# Patient Record
Sex: MALE | Race: BLACK OR AFRICAN AMERICAN | NOT HISPANIC OR LATINO | Employment: UNEMPLOYED | URBAN - METROPOLITAN AREA
[De-identification: names, ages, dates, MRNs, and addresses within clinical notes are randomized per-mention and may not be internally consistent; named-entity substitution may affect disease eponyms.]

---

## 2023-06-28 ENCOUNTER — HOSPITAL ENCOUNTER (OUTPATIENT)
Dept: DATA CONVERSION | Facility: HOSPITAL | Age: 28
End: 2023-06-28

## 2023-07-02 ENCOUNTER — HOSPITAL ENCOUNTER (OUTPATIENT)
Dept: DATA CONVERSION | Facility: HOSPITAL | Age: 28
End: 2023-07-02

## 2023-07-25 ENCOUNTER — HOSPITAL ENCOUNTER (OUTPATIENT)
Dept: DATA CONVERSION | Facility: HOSPITAL | Age: 28
End: 2023-07-25
Attending: EMERGENCY MEDICINE

## 2023-07-25 DIAGNOSIS — Z53.21 PROCEDURE AND TREATMENT NOT CARRIED OUT DUE TO PATIENT LEAVING PRIOR TO BEING SEEN BY HEALTH CARE PROVIDER: ICD-10-CM

## 2023-11-07 ENCOUNTER — HOSPITAL ENCOUNTER (EMERGENCY)
Facility: HOSPITAL | Age: 28
Discharge: HOME | End: 2023-11-07
Payer: MEDICAID

## 2023-11-07 ENCOUNTER — APPOINTMENT (OUTPATIENT)
Dept: RADIOLOGY | Facility: HOSPITAL | Age: 28
End: 2023-11-07

## 2023-11-07 VITALS
TEMPERATURE: 99 F | HEART RATE: 85 BPM | RESPIRATION RATE: 16 BRPM | DIASTOLIC BLOOD PRESSURE: 77 MMHG | SYSTOLIC BLOOD PRESSURE: 124 MMHG | OXYGEN SATURATION: 98 %

## 2023-11-07 DIAGNOSIS — R07.9 NONSPECIFIC CHEST PAIN: Primary | ICD-10-CM

## 2023-11-07 LAB
ANION GAP SERPL CALC-SCNC: 10 MMOL/L (ref 10–20)
BASOPHILS # BLD AUTO: 0.03 X10*3/UL (ref 0–0.1)
BASOPHILS NFR BLD AUTO: 0.4 %
BUN SERPL-MCNC: 9 MG/DL (ref 6–23)
CALCIUM SERPL-MCNC: 9.7 MG/DL (ref 8.6–10.3)
CARDIAC TROPONIN I PNL SERPL HS: 3 NG/L (ref 0–20)
CHLORIDE SERPL-SCNC: 101 MMOL/L (ref 98–107)
CO2 SERPL-SCNC: 31 MMOL/L (ref 21–32)
CREAT SERPL-MCNC: 0.89 MG/DL (ref 0.5–1.3)
D DIMER PPP FEU-MCNC: 363 NG/ML FEU
EOSINOPHIL # BLD AUTO: 0.12 X10*3/UL (ref 0–0.7)
EOSINOPHIL NFR BLD AUTO: 1.8 %
ERYTHROCYTE [DISTWIDTH] IN BLOOD BY AUTOMATED COUNT: 12.6 % (ref 11.5–14.5)
GFR SERPL CREATININE-BSD FRML MDRD: >90 ML/MIN/1.73M*2
GLUCOSE SERPL-MCNC: 87 MG/DL (ref 74–99)
HCT VFR BLD AUTO: 44.4 % (ref 41–52)
HGB BLD-MCNC: 15.1 G/DL (ref 13.5–17.5)
IMM GRANULOCYTES # BLD AUTO: 0.02 X10*3/UL (ref 0–0.7)
IMM GRANULOCYTES NFR BLD AUTO: 0.3 % (ref 0–0.9)
LYMPHOCYTES # BLD AUTO: 1.79 X10*3/UL (ref 1.2–4.8)
LYMPHOCYTES NFR BLD AUTO: 26.4 %
MCH RBC QN AUTO: 33 PG (ref 26–34)
MCHC RBC AUTO-ENTMCNC: 34 G/DL (ref 32–36)
MCV RBC AUTO: 97 FL (ref 80–100)
MONOCYTES # BLD AUTO: 0.69 X10*3/UL (ref 0.1–1)
MONOCYTES NFR BLD AUTO: 10.2 %
NEUTROPHILS # BLD AUTO: 4.14 X10*3/UL (ref 1.2–7.7)
NEUTROPHILS NFR BLD AUTO: 60.9 %
NRBC BLD-RTO: 0 /100 WBCS (ref 0–0)
PLATELET # BLD AUTO: 326 X10*3/UL (ref 150–450)
POTASSIUM SERPL-SCNC: 4.2 MMOL/L (ref 3.5–5.3)
RBC # BLD AUTO: 4.58 X10*6/UL (ref 4.5–5.9)
SODIUM SERPL-SCNC: 138 MMOL/L (ref 136–145)
WBC # BLD AUTO: 6.8 X10*3/UL (ref 4.4–11.3)

## 2023-11-07 PROCEDURE — 94760 N-INVAS EAR/PLS OXIMETRY 1: CPT

## 2023-11-07 PROCEDURE — 99285 EMERGENCY DEPT VISIT HI MDM: CPT | Mod: 25

## 2023-11-07 PROCEDURE — 99283 EMERGENCY DEPT VISIT LOW MDM: CPT | Mod: 25

## 2023-11-07 PROCEDURE — 36415 COLL VENOUS BLD VENIPUNCTURE: CPT | Performed by: EMERGENCY MEDICINE

## 2023-11-07 PROCEDURE — 85379 FIBRIN DEGRADATION QUANT: CPT | Performed by: EMERGENCY MEDICINE

## 2023-11-07 PROCEDURE — 85025 COMPLETE CBC W/AUTO DIFF WBC: CPT | Performed by: EMERGENCY MEDICINE

## 2023-11-07 PROCEDURE — 84484 ASSAY OF TROPONIN QUANT: CPT | Performed by: EMERGENCY MEDICINE

## 2023-11-07 PROCEDURE — 71046 X-RAY EXAM CHEST 2 VIEWS: CPT | Mod: FOREIGN READ | Performed by: RADIOLOGY

## 2023-11-07 PROCEDURE — 80048 BASIC METABOLIC PNL TOTAL CA: CPT | Performed by: EMERGENCY MEDICINE

## 2023-11-07 PROCEDURE — 71046 X-RAY EXAM CHEST 2 VIEWS: CPT | Mod: FY

## 2023-11-07 RX ORDER — NAPROXEN 500 MG/1
500 TABLET ORAL
Qty: 14 TABLET | Refills: 0 | Status: SHIPPED | OUTPATIENT
Start: 2023-11-07 | End: 2023-11-14

## 2023-11-07 ASSESSMENT — LIFESTYLE VARIABLES
EVER FELT BAD OR GUILTY ABOUT YOUR DRINKING: NO
EVER HAD A DRINK FIRST THING IN THE MORNING TO STEADY YOUR NERVES TO GET RID OF A HANGOVER: NO
HAVE YOU EVER FELT YOU SHOULD CUT DOWN ON YOUR DRINKING: NO
HAVE PEOPLE ANNOYED YOU BY CRITICIZING YOUR DRINKING: NO
REASON UNABLE TO ASSESS: NO

## 2023-11-07 ASSESSMENT — COLUMBIA-SUICIDE SEVERITY RATING SCALE - C-SSRS
2. HAVE YOU ACTUALLY HAD ANY THOUGHTS OF KILLING YOURSELF?: NO
6. HAVE YOU EVER DONE ANYTHING, STARTED TO DO ANYTHING, OR PREPARED TO DO ANYTHING TO END YOUR LIFE?: NO
1. IN THE PAST MONTH, HAVE YOU WISHED YOU WERE DEAD OR WISHED YOU COULD GO TO SLEEP AND NOT WAKE UP?: NO

## 2023-11-07 NOTE — ED PROVIDER NOTES
Limitations to History: None     HPI:       Corey Maciel is a 28 y.o. -American male with significant past medical history for IV drug use who presents to ED today from [facility] for evaluation of right-sided chest pain.  5 days ago the patient developed pain in the right anterior chest that is described as a nonradiating aching sensation that comes and goes currently rated 4/10.  No history of PE/DVT, recent travel, recent surgery, history malignancy or use of exogenous hormones.  No family history for MI or sudden cardiac death at a young age.  Denies fever/chills, cough/cold symptoms, shortness of breath, nausea/vomiting, abdominal pain, urinary symptoms, change in bowel habits or any other complaints.  Last use of meth, EtOH and smoking was 2 months ago.  No PCP.      Additional History Obtained from: None    ------------------------------------------------------------------------------------------------------------------------------------------    VS: As documented in the triage note and EMR flowsheet from this visit were reviewed.    Physical Exam:  Gen: Well-appearing 28-year-old -American male, nontoxic looking.  Pleasant and interactive.  Well-hydrated and nourished.  Head/Neck: NCAT, neck w/ FROM  Eyes: EOMI, PERRL, anicteric sclerae, noninjected conjunctivae  Ears: TMs clear b/l without sign of infection  Nose: Nares patent w/o rhinorrhea  Mouth:  MMM, no OP lesions noted  Heart: RRR no MRG.  Chest pain not reproducible.  Lungs: CTA b/l no RRW, no increased work of breathing  Abdomen: soft, NT, ND, no HSM, no palpable masses  Musculoskeletal: no joint swelling noted  Extremities: WWP, no c/c/e, cap refill <2sec  Neurologic: Alert, symmetrical facies, phonates clearly, moves all extremities equally, responsive to touch, ambulates normally   Skin: Pink warm and dry.  No rashes  noted        ------------------------------------------------------------------------------------------------------------------------------------------    Medical Decision Making:     ED Course as of 11/07/23 1817   Tue Nov 07, 2023 1811 28-year-old male is evaluated the bedside for right-sided chest pain that began 5 days ago and has been intermittent.  I saw the patient in triage, labs and imaging were placed.  EKG shows sinus rhythm. Laboratory studies were reviewed, no leukocytosis or evidence of anemia.  Normal kidney function, electrolytes and LFTs.  Troponin normal.  BRYON score 0.  With  D-dimer 363, low suspicion for PE.  Chest x-ray does not show pneumonia.  With a negative work-up here I feel comfortable discharging the patient home, I think his discomfort is likely muscular in nature.  Motrin/Tylenol Naprosyn for anti-inflammatory.  Repeat vital signs within normal limits.  No chest pain at this time.  Comfort measures discussed.  Will follow-up with a PCP at [facility] in the next 2 to 3 days.  Return precautions discussed.  Diagnosis, treatment and plan discussed with patient, he verbalizes understanding and is agreement.  Condition stable for discharge. [SB]      ED Course User Index  [SB] MEHNAZ Barrett         Diagnoses as of 11/07/23 1817   Nonspecific chest pain       EKG interpreted by Dr. Levi, 11:57 AM, normal sinus rhythm at a rate of 77, normal intervals, normal axis, no ST segment depression or elevation consistent with ischemia or infarction.    Chronic Medical Conditions Significantly Affecting Care: None    External Records Reviewed: I reviewed recent and relevant outside records including: None none    Discussion of Management with Other Providers:          MEHNAZ Barrett  11/07/23 1818

## 2023-11-07 NOTE — ED TRIAGE NOTES
TRIAGE NOTE   I saw the patient as the Clinician in Triage and performed a brief history and physical exam, established acuity, and ordered appropriate tests to develop basic plan of care. Patient will be seen by an UGO, resident and/or physician who will independently evaluate the patient. Please see subsequent provider notes for further details and disposition.     Brief HPI: In brief, Corey Maciel is a 28 y.o. -American male with significant past medical history for IV drug use who presents to ED today from [facility] for evaluation of right-sided chest pain.  5 days ago the patient developed pain in the right anterior chest that is described as a nonradiating aching sensation that comes and goes currently rated 4/10.  No history of PE/DVT, recent travel, recent surgery, history malignancy or use of exogenous hormones.  No family history for MI or sudden cardiac death at a young age.  Denies fever/chills, cough/cold symptoms, shortness of breath, nausea/vomiting, abdominal pain, urinary symptoms, change in bowel habits or any other complaints.  Last use of meth, EtOH and smoking was 2 months ago.  No PCP.    Focused Physical exam:   General: 28-year-old -American male, nontoxic looking.  No apparent distress.  Alert and oriented x3.  Well-hydrated and nourished.  Skin: Pink warm and dry.  Cardiac: Regular rate and rhythm.  Chest pain not reproducible.  Neurological: No focal neurological deficits.    Plan/MDM:   28-year-old -American male with history of IV drug abuse last 2 months ago is evaluated at the bedside for intermittent aching right-sided chest pain that began spontaneously 5 days ago.  On arrival to the ED, awake and alert, vital signs within normal limits.  Afebrile.  Lungs clear, abdomen soft and nontender.  Chest pain not reproducible.  Differential includes but is not limited to PE, ACS and pneumonia.  IV established, basic labs including D-dimer, EKG and chest x-ray  will be performed in preparation for further evaluation in the main ED.  Patient is agreeable to this plan.    Please see subsequent provider note for further details and disposition

## 2023-12-01 ENCOUNTER — HOSPITAL ENCOUNTER (OUTPATIENT)
Dept: CARDIOLOGY | Facility: HOSPITAL | Age: 28
Discharge: HOME | End: 2023-12-01
Payer: MEDICAID

## 2023-12-01 DIAGNOSIS — I49.8 OTHER SPECIFIED CARDIAC ARRHYTHMIAS: ICD-10-CM

## 2023-12-01 PROCEDURE — 93010 ELECTROCARDIOGRAM REPORT: CPT | Performed by: INTERNAL MEDICINE

## 2023-12-01 PROCEDURE — 93005 ELECTROCARDIOGRAM TRACING: CPT

## 2023-12-09 LAB
ATRIAL RATE: 71 BPM
P AXIS: 56 DEGREES
P OFFSET: 206 MS
P ONSET: 150 MS
PR INTERVAL: 138 MS
Q ONSET: 219 MS
QRS COUNT: 11 BEATS
QRS DURATION: 86 MS
QT INTERVAL: 386 MS
QTC CALCULATION(BAZETT): 419 MS
QTC FREDERICIA: 408 MS
R AXIS: 41 DEGREES
T AXIS: 52 DEGREES
T OFFSET: 412 MS
VENTRICULAR RATE: 71 BPM

## 2023-12-22 ENCOUNTER — HOSPITAL ENCOUNTER (OUTPATIENT)
Dept: CARDIOLOGY | Facility: HOSPITAL | Age: 28
Discharge: HOME | End: 2023-12-22
Payer: MEDICAID

## 2023-12-22 PROCEDURE — 93005 ELECTROCARDIOGRAM TRACING: CPT

## 2023-12-27 LAB
ATRIAL RATE: 77 BPM
P AXIS: 67 DEGREES
P OFFSET: 204 MS
P ONSET: 151 MS
PR INTERVAL: 140 MS
Q ONSET: 221 MS
QRS COUNT: 12 BEATS
QRS DURATION: 80 MS
QT INTERVAL: 364 MS
QTC CALCULATION(BAZETT): 411 MS
QTC FREDERICIA: 395 MS
R AXIS: 55 DEGREES
T AXIS: 67 DEGREES
T OFFSET: 403 MS
VENTRICULAR RATE: 77 BPM

## 2024-01-29 ENCOUNTER — HOSPITAL ENCOUNTER (EMERGENCY)
Facility: HOSPITAL | Age: 29
Discharge: HOME | End: 2024-01-29
Attending: EMERGENCY MEDICINE
Payer: MEDICAID

## 2024-01-29 VITALS
RESPIRATION RATE: 16 BRPM | SYSTOLIC BLOOD PRESSURE: 122 MMHG | DIASTOLIC BLOOD PRESSURE: 77 MMHG | TEMPERATURE: 99.2 F | HEART RATE: 90 BPM | OXYGEN SATURATION: 98 %

## 2024-01-29 DIAGNOSIS — K08.89 PAIN, DENTAL: Primary | ICD-10-CM

## 2024-01-29 PROCEDURE — 99283 EMERGENCY DEPT VISIT LOW MDM: CPT | Performed by: EMERGENCY MEDICINE

## 2024-01-29 RX ORDER — CHLORHEXIDINE GLUCONATE ORAL RINSE 1.2 MG/ML
15 SOLUTION DENTAL AS NEEDED
Qty: 120 ML | Refills: 0 | Status: SHIPPED | OUTPATIENT
Start: 2024-01-29 | End: 2024-02-12

## 2024-01-29 RX ORDER — ACETAMINOPHEN 325 MG/1
650 TABLET ORAL EVERY 6 HOURS PRN
Qty: 60 TABLET | Refills: 0 | Status: SHIPPED | OUTPATIENT
Start: 2024-01-29 | End: 2024-02-12

## 2024-01-29 ASSESSMENT — COLUMBIA-SUICIDE SEVERITY RATING SCALE - C-SSRS
6. HAVE YOU EVER DONE ANYTHING, STARTED TO DO ANYTHING, OR PREPARED TO DO ANYTHING TO END YOUR LIFE?: NO
2. HAVE YOU ACTUALLY HAD ANY THOUGHTS OF KILLING YOURSELF?: NO
1. IN THE PAST MONTH, HAVE YOU WISHED YOU WERE DEAD OR WISHED YOU COULD GO TO SLEEP AND NOT WAKE UP?: NO

## 2024-01-29 NOTE — ED PROVIDER NOTES
CC: Dental Pain     History provided by: Patient  Limitations to History: None    HPI:  Patient is a 28-year-old male with history of polysubstance use disorder who presents with dental complaint.  He states he feels like he has an abscess in his mouth however is able to swallow, eat, denies any significant pain, fevers, chills.  He states his front upper teeth have been missing for a while.  He does not routinely see a dentist.    External Records Reviewed:  I reviewed prior ED visits, Care Everywhere, discharge summaries and outpatient records as appropriate.   ???????????????????????????????????????????????????????????????  Triage Vitals:  T 37.3 °C (99.2 °F)  HR 90  /77  RR 16  O2 98 %      Physical Exam  Vitals and nursing note reviewed.   Constitutional:       General: He is not in acute distress.     Appearance: Normal appearance.   HENT:      Head: Normocephalic and atraumatic.      Jaw: There is normal jaw occlusion.      Mouth/Throat:      Dentition: Abnormal dentition. Gingival swelling and dental caries present. No dental tenderness or dental abscesses.      Pharynx: Oropharynx is clear. Uvula midline. No pharyngeal swelling or oropharyngeal exudate.        Comments: Missing dentition as highlighted above  Eyes:      Conjunctiva/sclera: Conjunctivae normal.   Cardiovascular:      Rate and Rhythm: Normal rate and regular rhythm.   Pulmonary:      Effort: Pulmonary effort is normal. No respiratory distress.   Abdominal:      General: Abdomen is flat.      Palpations: Abdomen is soft.   Musculoskeletal:         General: Normal range of motion.      Cervical back: Normal range of motion and neck supple.   Skin:     General: Skin is warm and dry.   Neurological:      General: No focal deficit present.      Mental Status: He is alert and oriented to person, place, and time. Mental status is at baseline.   Psychiatric:         Mood and Affect: Mood normal.         Behavior: Behavior normal.         ???????????????????????????????????????????????????????????????  ED Course/Treatment/Medical Decision Making  MDM:  Patient is a 28-year-old male who presents with dental complaint.  Vital signs are stable, patient does not appear septic or peritonitic.  Patient is nontoxic-appearing, has no voice changes, trismus, anterior cervical tenderness, fevers, facial swelling, concerning for deep neck infection.  Additionally, I do not appreciate significant periapical abscesses, fluctuance in his mouth.  Patient does have several missing teeth, dental caries and gingival swelling for which I will prescribe chlorhexidine mouthwash and provided free dental clinic information.  Patient verbalized understanding to follow-up and return precautions.  He declined any pain medication at this time.      ED Course:       EKG Interpretation:  See ED Course/Below:    Independent Interpretation of Studies:  I independently interpreted labs/imaging as stated in ED Course or below.    Differential diagnoses considered include but are not limited to: See MDM/Below:    Social Determinants Limiting Care:  Housing insecurity and Poor health literacy      Disposition:  Discharged    Wendy Tatum, ANN, PGY-2    I reviewed the case with the attending ED physician. The attending ED physician agrees with the plan. Patient and/or patient´s representative was counseled regarding labs, imaging, likely diagnosis, and plan. All questions were answered.    Disclaimer: This note was dictated by speech recognition.  Attempt at proofreading was made to minimize errors.  Errors in transcription may be present.  Please call if questions.    Procedures ? Chromatin last updated 1/29/2024 1:24 AM        Wendy Tatum, DO  Resident  01/29/24 0136

## 2024-01-29 NOTE — ED TRIAGE NOTES
Patient presents to the ED for dental pain. Patient states he has abscesses in his mouth along his gum line.

## 2024-02-07 ENCOUNTER — HOSPITAL ENCOUNTER (EMERGENCY)
Facility: HOSPITAL | Age: 29
Discharge: HOME | End: 2024-02-07
Attending: EMERGENCY MEDICINE
Payer: MEDICAID

## 2024-02-07 VITALS
DIASTOLIC BLOOD PRESSURE: 82 MMHG | RESPIRATION RATE: 19 BRPM | HEIGHT: 71 IN | SYSTOLIC BLOOD PRESSURE: 118 MMHG | HEART RATE: 87 BPM | TEMPERATURE: 98.3 F | BODY MASS INDEX: 26.6 KG/M2 | OXYGEN SATURATION: 99 % | WEIGHT: 190 LBS

## 2024-02-07 DIAGNOSIS — T30.0 BURN INJURY: Primary | ICD-10-CM

## 2024-02-07 PROCEDURE — 99283 EMERGENCY DEPT VISIT LOW MDM: CPT | Performed by: EMERGENCY MEDICINE

## 2024-02-07 PROCEDURE — 99284 EMERGENCY DEPT VISIT MOD MDM: CPT | Performed by: EMERGENCY MEDICINE

## 2024-02-07 RX ORDER — BACITRACIN ZINC 500 UNIT/G
1 OINTMENT (GRAM) TOPICAL 2 TIMES DAILY
Qty: 14 G | Refills: 0 | Status: SHIPPED | OUTPATIENT
Start: 2024-02-07 | End: 2024-06-26

## 2024-02-07 ASSESSMENT — COLUMBIA-SUICIDE SEVERITY RATING SCALE - C-SSRS
6. HAVE YOU EVER DONE ANYTHING, STARTED TO DO ANYTHING, OR PREPARED TO DO ANYTHING TO END YOUR LIFE?: NO
1. IN THE PAST MONTH, HAVE YOU WISHED YOU WERE DEAD OR WISHED YOU COULD GO TO SLEEP AND NOT WAKE UP?: NO
2. HAVE YOU ACTUALLY HAD ANY THOUGHTS OF KILLING YOURSELF?: NO
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
## 2024-02-07 NOTE — ED TRIAGE NOTES
Patient presents to the ED for left finger pain. States that he has a wound on his left index finger and wants it checked out to prevent infection.

## 2024-02-22 ENCOUNTER — HOSPITAL ENCOUNTER (EMERGENCY)
Facility: HOSPITAL | Age: 29
Discharge: HOME | End: 2024-02-22
Payer: MEDICAID

## 2024-02-22 VITALS
RESPIRATION RATE: 16 BRPM | SYSTOLIC BLOOD PRESSURE: 119 MMHG | OXYGEN SATURATION: 98 % | DIASTOLIC BLOOD PRESSURE: 76 MMHG | HEART RATE: 103 BPM | TEMPERATURE: 97.7 F

## 2024-02-22 PROCEDURE — 4500999001 HC ED NO CHARGE

## 2024-04-27 ENCOUNTER — HOSPITAL ENCOUNTER (EMERGENCY)
Facility: HOSPITAL | Age: 29
Discharge: HOME | End: 2024-04-27
Attending: EMERGENCY MEDICINE
Payer: MEDICAID

## 2024-04-27 VITALS
TEMPERATURE: 97.9 F | WEIGHT: 180 LBS | BODY MASS INDEX: 25.2 KG/M2 | SYSTOLIC BLOOD PRESSURE: 157 MMHG | OXYGEN SATURATION: 96 % | HEART RATE: 98 BPM | RESPIRATION RATE: 18 BRPM | HEIGHT: 71 IN | DIASTOLIC BLOOD PRESSURE: 98 MMHG

## 2024-04-27 DIAGNOSIS — Z20.2 STD EXPOSURE: Primary | ICD-10-CM

## 2024-04-27 LAB
ALBUMIN SERPL BCP-MCNC: 4.7 G/DL (ref 3.4–5)
ALP SERPL-CCNC: 74 U/L (ref 33–120)
ALT SERPL W P-5'-P-CCNC: 11 U/L (ref 10–52)
ANION GAP SERPL CALC-SCNC: 16 MMOL/L (ref 10–20)
APPEARANCE UR: CLEAR
AST SERPL W P-5'-P-CCNC: 19 U/L (ref 9–39)
BASOPHILS # BLD AUTO: 0.04 X10*3/UL (ref 0–0.1)
BASOPHILS NFR BLD AUTO: 0.6 %
BILIRUB SERPL-MCNC: 0.5 MG/DL (ref 0–1.2)
BILIRUB UR STRIP.AUTO-MCNC: NEGATIVE MG/DL
BUN SERPL-MCNC: 5 MG/DL (ref 6–23)
C TRACH RRNA SPEC QL NAA+PROBE: NEGATIVE
CALCIUM SERPL-MCNC: 9.5 MG/DL (ref 8.6–10.6)
CHLORIDE SERPL-SCNC: 102 MMOL/L (ref 98–107)
CO2 SERPL-SCNC: 23 MMOL/L (ref 21–32)
COLOR UR: COLORLESS
CREAT SERPL-MCNC: 0.89 MG/DL (ref 0.5–1.3)
EGFRCR SERPLBLD CKD-EPI 2021: >90 ML/MIN/1.73M*2
EOSINOPHIL # BLD AUTO: 0.03 X10*3/UL (ref 0–0.7)
EOSINOPHIL NFR BLD AUTO: 0.4 %
ERYTHROCYTE [DISTWIDTH] IN BLOOD BY AUTOMATED COUNT: 12.7 % (ref 11.5–14.5)
GLUCOSE SERPL-MCNC: 111 MG/DL (ref 74–99)
GLUCOSE UR STRIP.AUTO-MCNC: NORMAL MG/DL
HCT VFR BLD AUTO: 40.9 % (ref 41–52)
HGB BLD-MCNC: 14.8 G/DL (ref 13.5–17.5)
HIV 1+2 AB+HIV1P24 AG SERPLBLD IA.RAPID: NONREACTIVE
HOLD SPECIMEN: NORMAL
IMM GRANULOCYTES # BLD AUTO: 0.02 X10*3/UL (ref 0–0.7)
IMM GRANULOCYTES NFR BLD AUTO: 0.3 % (ref 0–0.9)
KETONES UR STRIP.AUTO-MCNC: NEGATIVE MG/DL
LEUKOCYTE ESTERASE UR QL STRIP.AUTO: NEGATIVE
LYMPHOCYTES # BLD AUTO: 1.04 X10*3/UL (ref 1.2–4.8)
LYMPHOCYTES NFR BLD AUTO: 14.5 %
MCH RBC QN AUTO: 32.1 PG (ref 26–34)
MCHC RBC AUTO-ENTMCNC: 36.2 G/DL (ref 32–36)
MCV RBC AUTO: 89 FL (ref 80–100)
MONOCYTES # BLD AUTO: 0.65 X10*3/UL (ref 0.1–1)
MONOCYTES NFR BLD AUTO: 9.1 %
N GONORRHOEA DNA SPEC QL PROBE+SIG AMP: NEGATIVE
NEUTROPHILS # BLD AUTO: 5.4 X10*3/UL (ref 1.2–7.7)
NEUTROPHILS NFR BLD AUTO: 75.1 %
NITRITE UR QL STRIP.AUTO: NEGATIVE
NRBC BLD-RTO: 0 /100 WBCS (ref 0–0)
PH UR STRIP.AUTO: 6.5 [PH]
PLATELET # BLD AUTO: 299 X10*3/UL (ref 150–450)
POTASSIUM SERPL-SCNC: 4.1 MMOL/L (ref 3.5–5.3)
PROT SERPL-MCNC: 7.4 G/DL (ref 6.4–8.2)
PROT UR STRIP.AUTO-MCNC: NEGATIVE MG/DL
RBC # BLD AUTO: 4.61 X10*6/UL (ref 4.5–5.9)
RBC # UR STRIP.AUTO: NEGATIVE /UL
SODIUM SERPL-SCNC: 137 MMOL/L (ref 136–145)
SP GR UR STRIP.AUTO: 1.01
TREPONEMA PALLIDUM IGG+IGM AB [PRESENCE] IN SERUM OR PLASMA BY IMMUNOASSAY: NONREACTIVE
UROBILINOGEN UR STRIP.AUTO-MCNC: NORMAL MG/DL
WBC # BLD AUTO: 7.2 X10*3/UL (ref 4.4–11.3)

## 2024-04-27 PROCEDURE — 84075 ASSAY ALKALINE PHOSPHATASE: CPT

## 2024-04-27 PROCEDURE — 86703 HIV-1/HIV-2 1 RESULT ANTBDY: CPT

## 2024-04-27 PROCEDURE — 99284 EMERGENCY DEPT VISIT MOD MDM: CPT | Performed by: EMERGENCY MEDICINE

## 2024-04-27 PROCEDURE — 99283 EMERGENCY DEPT VISIT LOW MDM: CPT

## 2024-04-27 PROCEDURE — 87800 DETECT AGNT MULT DNA DIREC: CPT

## 2024-04-27 PROCEDURE — 81003 URINALYSIS AUTO W/O SCOPE: CPT

## 2024-04-27 PROCEDURE — 85025 COMPLETE CBC W/AUTO DIFF WBC: CPT

## 2024-04-27 PROCEDURE — 86780 TREPONEMA PALLIDUM: CPT

## 2024-04-27 PROCEDURE — 36415 COLL VENOUS BLD VENIPUNCTURE: CPT

## 2024-04-27 RX ORDER — DOXYCYCLINE HYCLATE 100 MG
100 TABLET ORAL 2 TIMES DAILY
Qty: 14 TABLET | Refills: 0 | Status: SHIPPED | OUTPATIENT
Start: 2024-04-27 | End: 2024-05-04

## 2024-04-27 ASSESSMENT — COLUMBIA-SUICIDE SEVERITY RATING SCALE - C-SSRS
1. IN THE PAST MONTH, HAVE YOU WISHED YOU WERE DEAD OR WISHED YOU COULD GO TO SLEEP AND NOT WAKE UP?: NO
2. HAVE YOU ACTUALLY HAD ANY THOUGHTS OF KILLING YOURSELF?: NO
6. HAVE YOU EVER DONE ANYTHING, STARTED TO DO ANYTHING, OR PREPARED TO DO ANYTHING TO END YOUR LIFE?: NO
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
## 2024-04-27 NOTE — DISCHARGE INSTRUCTIONS
Your HIV testing was negative today.  In the meantime, please follow-up with your primary care provider for ongoing symptoms.

## 2024-05-02 ENCOUNTER — APPOINTMENT (OUTPATIENT)
Dept: CARDIOLOGY | Facility: HOSPITAL | Age: 29
End: 2024-05-02
Payer: MEDICAID

## 2024-05-02 ENCOUNTER — HOSPITAL ENCOUNTER (EMERGENCY)
Facility: HOSPITAL | Age: 29
Discharge: OTHER INSTITUTION | End: 2024-05-02
Attending: EMERGENCY MEDICINE
Payer: MEDICAID

## 2024-05-02 ENCOUNTER — HOSPITAL ENCOUNTER (INPATIENT)
Facility: HOSPITAL | Age: 29
End: 2024-05-02
Attending: PSYCHIATRY & NEUROLOGY | Admitting: PSYCHIATRY & NEUROLOGY
Payer: COMMERCIAL

## 2024-05-02 VITALS
OXYGEN SATURATION: 98 % | TEMPERATURE: 97.5 F | RESPIRATION RATE: 20 BRPM | DIASTOLIC BLOOD PRESSURE: 78 MMHG | HEART RATE: 73 BPM | SYSTOLIC BLOOD PRESSURE: 123 MMHG

## 2024-05-02 DIAGNOSIS — R45.850 HOMICIDAL THOUGHTS: Primary | ICD-10-CM

## 2024-05-02 LAB
ALBUMIN SERPL BCP-MCNC: 4.3 G/DL (ref 3.4–5)
ALP SERPL-CCNC: 69 U/L (ref 33–120)
ALT SERPL W P-5'-P-CCNC: 13 U/L (ref 10–52)
ANION GAP SERPL CALC-SCNC: 9 MMOL/L (ref 10–20)
APAP SERPL-MCNC: <10 UG/ML
AST SERPL W P-5'-P-CCNC: 31 U/L (ref 9–39)
BASOPHILS # BLD AUTO: 0.03 X10*3/UL (ref 0–0.1)
BASOPHILS NFR BLD AUTO: 0.5 %
BILIRUB SERPL-MCNC: 0.5 MG/DL (ref 0–1.2)
BUN SERPL-MCNC: 4 MG/DL (ref 6–23)
CALCIUM SERPL-MCNC: 8.9 MG/DL (ref 8.6–10.3)
CHLORIDE SERPL-SCNC: 103 MMOL/L (ref 98–107)
CO2 SERPL-SCNC: 30 MMOL/L (ref 21–32)
CREAT SERPL-MCNC: 0.5 MG/DL (ref 0.5–1.3)
EGFRCR SERPLBLD CKD-EPI 2021: >90 ML/MIN/1.73M*2
EOSINOPHIL # BLD AUTO: 0.03 X10*3/UL (ref 0–0.7)
EOSINOPHIL NFR BLD AUTO: 0.5 %
ERYTHROCYTE [DISTWIDTH] IN BLOOD BY AUTOMATED COUNT: 12.8 % (ref 11.5–14.5)
ETHANOL SERPL-MCNC: <10 MG/DL
GLUCOSE SERPL-MCNC: 99 MG/DL (ref 74–99)
HCT VFR BLD AUTO: 40.4 % (ref 41–52)
HGB BLD-MCNC: 14.5 G/DL (ref 13.5–17.5)
IMM GRANULOCYTES # BLD AUTO: 0.01 X10*3/UL (ref 0–0.7)
IMM GRANULOCYTES NFR BLD AUTO: 0.2 % (ref 0–0.9)
LYMPHOCYTES # BLD AUTO: 1.17 X10*3/UL (ref 1.2–4.8)
LYMPHOCYTES NFR BLD AUTO: 20.4 %
MCH RBC QN AUTO: 33.5 PG (ref 26–34)
MCHC RBC AUTO-ENTMCNC: 35.9 G/DL (ref 32–36)
MCV RBC AUTO: 93 FL (ref 80–100)
MONOCYTES # BLD AUTO: 0.74 X10*3/UL (ref 0.1–1)
MONOCYTES NFR BLD AUTO: 12.9 %
NEUTROPHILS # BLD AUTO: 3.75 X10*3/UL (ref 1.2–7.7)
NEUTROPHILS NFR BLD AUTO: 65.5 %
NRBC BLD-RTO: 0 /100 WBCS (ref 0–0)
PLATELET # BLD AUTO: 298 X10*3/UL (ref 150–450)
POTASSIUM SERPL-SCNC: 3.2 MMOL/L (ref 3.5–5.3)
PROT SERPL-MCNC: 7.1 G/DL (ref 6.4–8.2)
RBC # BLD AUTO: 4.33 X10*6/UL (ref 4.5–5.9)
SALICYLATES SERPL-MCNC: <3 MG/DL
SODIUM SERPL-SCNC: 139 MMOL/L (ref 136–145)
WBC # BLD AUTO: 5.7 X10*3/UL (ref 4.4–11.3)

## 2024-05-02 PROCEDURE — 99204 OFFICE O/P NEW MOD 45 MIN: CPT | Performed by: PSYCHIATRY & NEUROLOGY

## 2024-05-02 PROCEDURE — 80053 COMPREHEN METABOLIC PANEL: CPT | Performed by: EMERGENCY MEDICINE

## 2024-05-02 PROCEDURE — 99284 EMERGENCY DEPT VISIT MOD MDM: CPT | Mod: 25

## 2024-05-02 PROCEDURE — 93005 ELECTROCARDIOGRAM TRACING: CPT

## 2024-05-02 PROCEDURE — 85025 COMPLETE CBC W/AUTO DIFF WBC: CPT | Performed by: EMERGENCY MEDICINE

## 2024-05-02 PROCEDURE — 80143 DRUG ASSAY ACETAMINOPHEN: CPT | Performed by: EMERGENCY MEDICINE

## 2024-05-02 PROCEDURE — 36415 COLL VENOUS BLD VENIPUNCTURE: CPT | Performed by: EMERGENCY MEDICINE

## 2024-05-02 SDOH — HEALTH STABILITY: MENTAL HEALTH: HALLUCINATION: AUDITORY

## 2024-05-02 SDOH — HEALTH STABILITY: MENTAL HEALTH: HAVE YOU EVER DONE ANYTHING, STARTED TO DO ANYTHING, OR PREPARED TO DO ANYTHING TO END YOUR LIFE?: NO

## 2024-05-02 SDOH — HEALTH STABILITY: MENTAL HEALTH: HAVE YOU WISHED YOU WERE DEAD OR WISHED YOU COULD GO TO SLEEP AND NOT WAKE UP?: YES

## 2024-05-02 SDOH — HEALTH STABILITY: MENTAL HEALTH: HAVE YOU ACTUALLY HAD ANY THOUGHTS OF KILLING YOURSELF?: NO

## 2024-05-02 SDOH — HEALTH STABILITY: MENTAL HEALTH: SLEEP PATTERN: RESTLESSNESS

## 2024-05-02 SDOH — HEALTH STABILITY: MENTAL HEALTH: BEHAVIORS/MOOD: ANXIOUS;AFFECT INCONSISTENT WITH MOOD

## 2024-05-02 SDOH — HEALTH STABILITY: MENTAL HEALTH: SLEEP PATTERN: UNABLE TO ASSESS

## 2024-05-02 SDOH — HEALTH STABILITY: MENTAL HEALTH: BEHAVIORS/MOOD: CALM;COOPERATIVE

## 2024-05-02 SDOH — SOCIAL STABILITY: SOCIAL NETWORK: EMOTIONAL SUPPORT GIVEN: REASSURE

## 2024-05-02 SDOH — HEALTH STABILITY: MENTAL HEALTH: NEEDS EXPRESSED: PHYSICAL;EMOTIONAL

## 2024-05-02 SDOH — HEALTH STABILITY: MENTAL HEALTH

## 2024-05-02 ASSESSMENT — PAIN SCALES - GENERAL
PAINLEVEL_OUTOF10: 9
PAINLEVEL_OUTOF10: 3

## 2024-05-02 ASSESSMENT — PAIN - FUNCTIONAL ASSESSMENT: PAIN_FUNCTIONAL_ASSESSMENT: 0-10

## 2024-05-02 ASSESSMENT — PAIN DESCRIPTION - PAIN TYPE: TYPE: ACUTE PAIN

## 2024-05-02 ASSESSMENT — PAIN DESCRIPTION - LOCATION: LOCATION: CHEST

## 2024-05-02 ASSESSMENT — PAIN DESCRIPTION - ONSET: ONSET: GRADUAL

## 2024-05-02 ASSESSMENT — PAIN DESCRIPTION - DESCRIPTORS: DESCRIPTORS: SHARP

## 2024-05-02 NOTE — PROGRESS NOTES
Transition of care note:  This person was turned over to me from prior provider.  Patient came in with homicidal ideation he was evaluated by [facility] and deemed stable to be discharged in the care of police.  Patient is here under police custody.  Patient will be discharged.    Diagnoses as of 05/02/24 1122   Homicidal thoughts      Visit Vitals  /62 (BP Location: Left arm, Patient Position: Lying)   Pulse 80   Temp 36.4 °C (97.5 °F) (Tympanic)   Resp 18   SpO2 98%        Labs Reviewed   CBC WITH AUTO DIFFERENTIAL - Abnormal       Result Value    WBC 5.7      nRBC 0.0      RBC 4.33 (*)     Hemoglobin 14.5      Hematocrit 40.4 (*)     MCV 93      MCH 33.5      MCHC 35.9      RDW 12.8      Platelets 298      Neutrophils % 65.5      Immature Granulocytes %, Automated 0.2      Lymphocytes % 20.4      Monocytes % 12.9      Eosinophils % 0.5      Basophils % 0.5      Neutrophils Absolute 3.75      Immature Granulocytes Absolute, Automated 0.01      Lymphocytes Absolute 1.17 (*)     Monocytes Absolute 0.74      Eosinophils Absolute 0.03      Basophils Absolute 0.03     COMPREHENSIVE METABOLIC PANEL - Abnormal    Glucose 99      Sodium 139      Potassium 3.2 (*)     Chloride 103      Bicarbonate 30      Anion Gap 9 (*)     Urea Nitrogen 4 (*)     Creatinine 0.50      eGFR >90      Calcium 8.9      Albumin 4.3      Alkaline Phosphatase 69      Total Protein 7.1      AST 31      Bilirubin, Total 0.5      ALT 13     ACUTE TOXICOLOGY PANEL, BLOOD - Normal    Acetaminophen <10.0      Salicylate  <3      Alcohol <10     DRUG SCREEN,URINE         1. Homicidal thoughts

## 2024-05-02 NOTE — CONSULTS
"Referring Provider  Dr. Minesh Go [facility]    History Of Present Illness  Corey Maciel is a 28 y.o. male presenting with reported HI.    Recounts that he was brought to the ED from [facility]. He was using the bathroom and the police opened the door and told him he had a warrant. Police brought him to the ED after he reported chest pain. He used methamphetamines last night and has been taking them frequently in recent weeks. He has also been using alcohol.     Mr. Maciel reports that he feels like someone is out to get him. He does not think it is anyone specific, but rather people in general. He also reported that he would got back to his neighborhood and shoot at people, but only if they shot at him. However, Mr. Maciel does not have a gun and does not have any knives. He has been  having these thoughts for the past few weeks but has not acted on it because \"maybe I ain't got to do it.\" He also stated that he would not do anything because he does not want to go to detention for the rest of his life. He does not have any thoughts of wanting to harm himself and no history of suicide attempt or self-harm.     He reported that he has always been paranoid but says that it has gotten worse over the last couple of months. He believes that cars are following him because he sees \"[place] cars all the time.\" Does not believe that it is anyone specific following him. No passivity delusions. No grandiose delusions.     He reports hearing a voice \"every once in a while.\" Hears someone calling his name or talking to him or people laughing. Reports that it has not happened before. He has not had any VH.     Reports that he is prescribed Invega, and last received about a month ago at [facility]. However, has not been at [facility] since March. He is currently staying in shelters or outside. Has also taken risperidone in the past and is willing to take it again.     Past Medical History  He has no past medical history on " "file.    Surgical History  He has no past surgical history on file.     Social History  He has no history on file for tobacco use, alcohol use, and drug use.     Allergies  Patient has no known allergies.    Review of Systems    Psychiatric ROS - Adult  Anxiety: Negative  Depression: appetite decreased  Delirium: negative  Psychosis: delusions and auditory hallucinations  Rowan: not needing much sleep  Safety Issues: homicidal ideation  Psychiatric ROS Comment: See HPI    Physical Exam      Mental Status Exam  Mental Status Examination  General Appearance:  Disheveled, laying in hospital bed with covers drawn over body. Only head visible.  Gait/Station:  Unable to assess.   Speech: Normal rate, volume, prosody  Mood: \"I don't want to go back to long-term.\"   Affect: Congruent with mood and topic of conversation  Thought Process: Linear, goal directed  Thought Associations: No loosening of associations  Thought Content:  reports non-specific thoughts of shooting back at people if they shot at him first.   Perception:  AH as described in HPI.  Level of Consciousness: Alert  Orientation: Alert and oriented to person, place, time and situation  Attention and Concentration: Intact  Recent Memory: Intact as evidenced by ability to recall details from the past 24 hours   Remote Memory: Intact as evidenced by ability to recall previous medical issues   Executive function: Intact  Language:  Fluent speech without syntax errors.   Fund of knowledge:  Not assessed.  Insight: Intact, as evidenced by understanding of prior dx  Judgment: Intact, as evidenced by compliance with treatment recommendations       Psychiatric Risk Assessment  Violence Risk Assessment: lower socioeconomic class, persecutory delusions, and substance abuse  Acute Risk of Harm to Others is Considered: low   Suicide Risk Assessment: none  Protective Factors against Suicide: other no active suicidal ideation  Acute Risk of Harm to Self is Considered: low    Last " Recorded Vitals  Blood pressure 119/62, pulse 80, temperature 36.4 °C (97.5 °F), temperature source Tympanic, resp. rate 18, SpO2 98%.    Relevant Results    Results for orders placed or performed during the hospital encounter of 05/02/24 (from the past 24 hour(s))   CBC and Auto Differential   Result Value Ref Range    WBC 5.7 4.4 - 11.3 x10*3/uL    nRBC 0.0 0.0 - 0.0 /100 WBCs    RBC 4.33 (L) 4.50 - 5.90 x10*6/uL    Hemoglobin 14.5 13.5 - 17.5 g/dL    Hematocrit 40.4 (L) 41.0 - 52.0 %    MCV 93 80 - 100 fL    MCH 33.5 26.0 - 34.0 pg    MCHC 35.9 32.0 - 36.0 g/dL    RDW 12.8 11.5 - 14.5 %    Platelets 298 150 - 450 x10*3/uL    Neutrophils % 65.5 40.0 - 80.0 %    Immature Granulocytes %, Automated 0.2 0.0 - 0.9 %    Lymphocytes % 20.4 13.0 - 44.0 %    Monocytes % 12.9 2.0 - 10.0 %    Eosinophils % 0.5 0.0 - 6.0 %    Basophils % 0.5 0.0 - 2.0 %    Neutrophils Absolute 3.75 1.20 - 7.70 x10*3/uL    Immature Granulocytes Absolute, Automated 0.01 0.00 - 0.70 x10*3/uL    Lymphocytes Absolute 1.17 (L) 1.20 - 4.80 x10*3/uL    Monocytes Absolute 0.74 0.10 - 1.00 x10*3/uL    Eosinophils Absolute 0.03 0.00 - 0.70 x10*3/uL    Basophils Absolute 0.03 0.00 - 0.10 x10*3/uL   Comprehensive Metabolic Panel   Result Value Ref Range    Glucose 99 74 - 99 mg/dL    Sodium 139 136 - 145 mmol/L    Potassium 3.2 (L) 3.5 - 5.3 mmol/L    Chloride 103 98 - 107 mmol/L    Bicarbonate 30 21 - 32 mmol/L    Anion Gap 9 (L) 10 - 20 mmol/L    Urea Nitrogen 4 (L) 6 - 23 mg/dL    Creatinine 0.50 0.50 - 1.30 mg/dL    eGFR >90 >60 mL/min/1.73m*2    Calcium 8.9 8.6 - 10.3 mg/dL    Albumin 4.3 3.4 - 5.0 g/dL    Alkaline Phosphatase 69 33 - 120 U/L    Total Protein 7.1 6.4 - 8.2 g/dL    AST 31 9 - 39 U/L    Bilirubin, Total 0.5 0.0 - 1.2 mg/dL    ALT 13 10 - 52 U/L   Acute Toxicology Panel, Blood   Result Value Ref Range    Acetaminophen <10.0 10.0 - 30.0 ug/mL    Salicylate  <3 4 - 20 mg/dL    Alcohol <10 <=10 mg/dL       N/A - UTOX pending    "  Assessment/Plan   Active Problems:  There are no active Hospital Problems.      Mr. 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 He reports persecutory delusions of unnamed others planning to harm him and following him. He also reports vague thoughts of \"going back to my neighborhood\" and shooting at people if they shoot at him first. He does not have access to a firearm or any other weapon at this time. Duty to Protect does not attach in this instance due to the lack of a specific target, lack of means to carry out the threat, and lack of ability to carry out the threat (he is currently in police custody). Given his ongoing delusions and AH, antipsychotic treatment is reasonable but not emergent. He is known to [facility] mental health services and can re-establish care with them as he is in police custody.     Plan:   - Mr. Maciel is appropriate for discharge into police custody.   - If police indicate that they will not be taking Mr. Maciel into police custody, please re-consult EPAT so that we can provide further recommendations.        I spent 60 minutes in the professional and overall care of this patient.      Medication Consent  Medication Consent: n/a; consult service    Ray Palomino MD          "

## 2024-05-02 NOTE — ED TRIAGE NOTES
"Patient   AG, Patient was allegedly brought into police custody from [facility] pharmacy due to outstanding warrants. Per [employer] when patient was notified about outstanding warrants, he began referencing CP, and thoughts of HI. On arrival patient states he has Pain in his left chest, sharp and constant, non-radiating. Patient states he hears voices telling him to \"go back to his old neighborhood and kill everyone. He stated \"If I had a gun I would of\". Patient endorses methamphetamine use three hours prior to arrival. Patient states he has SOB, Fatigue, and dizziness. Patient is able to speak full sentences without noted accessory muscle use.   "

## 2024-05-02 NOTE — ED TRIAGE NOTES
"Patient BIBA for [employer], Patient was allegedly brought into police custody from [facility] pharmacy due to outstanding warrants. Per [employer] when patient was notified about outstanding warrants, he began referencing CP, and thought of HI. On arrival patient states he has Pain in his left chest, sharp and constant. Patient states hears voices telling him to \"go back to his old neighborhood and kill everyone. He stated \"If I had a gun I would of\".  Patient endorse methamphetamine use three hours prior to arrival. Patient states he has SOB, Fatigue, and dizziness.   "

## 2024-05-03 NOTE — ED PROVIDER NOTES
HPI   Chief Complaint   Patient presents with    Homicidal       Patient was brought in by police for bizarre behavior and homicidal hallucinations.  Patient states that he recently has used methamphetamine.  He states he has a history of schizophrenia.  He believes that his medications are not working.  He denies any suicidal thoughts.  He apparently was in a gas station bathroom and he was in there for about 30 minutes so police were called and when police arrived they realized he he had a warrant.  He is currently also under police custody.                          Robson Coma Scale Score: 15                     Patient History   No past medical history on file.  No past surgical history on file.  No family history on file.  Social History     Tobacco Use    Smoking status: Not on file    Smokeless tobacco: Not on file   Substance Use Topics    Alcohol use: Not on file    Drug use: Not on file       Physical Exam   ED Triage Vitals [05/02/24 0406]   Temperature Heart Rate Respirations BP   36.4 °C (97.5 °F) 80 18 119/62      Pulse Ox Temp Source Heart Rate Source Patient Position   98 % Tympanic Monitor Lying      BP Location FiO2 (%)     Left arm --       Physical Exam  Vitals and nursing note reviewed.   Constitutional:       General: He is not in acute distress.     Appearance: He is well-developed.   HENT:      Head: Normocephalic and atraumatic.   Eyes:      Conjunctiva/sclera: Conjunctivae normal.   Cardiovascular:      Rate and Rhythm: Normal rate and regular rhythm.      Heart sounds: No murmur heard.  Pulmonary:      Effort: Pulmonary effort is normal. No respiratory distress.      Breath sounds: Normal breath sounds.   Abdominal:      Palpations: Abdomen is soft.      Tenderness: There is no abdominal tenderness.   Musculoskeletal:         General: No swelling.      Cervical back: Neck supple.   Skin:     General: Skin is warm and dry.      Capillary Refill: Capillary refill takes less than 2 seconds.    Neurological:      Mental Status: He is alert.   Psychiatric:      Comments:  disorganized thought content.  No suicidal ideation.  Admits to auditory hallucinations.         ED Course & MDM   Diagnoses as of 05/03/24 0424   Homicidal thoughts       Medical Decision Making  Differential diagnosis is schizophrenia, methamphetamine abuse, malingering, etc.    Standard psychiatric testing for medical clearance was performed.  Patient is medically cleared for psychiatric disposition.  Patient will be evaluated by emergency psychiatric assessment team.  This is pending at this time.  Prior medical records were reviewed.        Procedure  Procedures     Alvin Edge MD  05/03/24 4064

## 2024-05-04 LAB
ATRIAL RATE: 87 BPM
P AXIS: 0 DEGREES
PR INTERVAL: 132 MS
Q ONSET: 253 MS
QRS COUNT: 13 BEATS
QRS DURATION: 94 MS
QT INTERVAL: 391 MS
QTC CALCULATION(BAZETT): 463 MS
QTC FREDERICIA: 437 MS
R AXIS: 57 DEGREES
T AXIS: 64 DEGREES
T OFFSET: 449 MS
VENTRICULAR RATE: 84 BPM

## 2024-05-10 ENCOUNTER — HOSPITAL ENCOUNTER (EMERGENCY)
Facility: HOSPITAL | Age: 29
Discharge: HOME | End: 2024-05-10
Payer: MEDICAID

## 2024-05-10 VITALS
OXYGEN SATURATION: 98 % | HEART RATE: 101 BPM | BODY MASS INDEX: 22.4 KG/M2 | SYSTOLIC BLOOD PRESSURE: 110 MMHG | WEIGHT: 160 LBS | RESPIRATION RATE: 18 BRPM | HEIGHT: 71 IN | TEMPERATURE: 98.1 F | DIASTOLIC BLOOD PRESSURE: 73 MMHG

## 2024-05-10 PROCEDURE — 4500999001 HC ED NO CHARGE

## 2024-05-10 ASSESSMENT — COLUMBIA-SUICIDE SEVERITY RATING SCALE - C-SSRS
1. IN THE PAST MONTH, HAVE YOU WISHED YOU WERE DEAD OR WISHED YOU COULD GO TO SLEEP AND NOT WAKE UP?: NO
6. HAVE YOU EVER DONE ANYTHING, STARTED TO DO ANYTHING, OR PREPARED TO DO ANYTHING TO END YOUR LIFE?: NO
2. HAVE YOU ACTUALLY HAD ANY THOUGHTS OF KILLING YOURSELF?: NO

## 2024-05-10 NOTE — ED TRIAGE NOTES
Pt to ED c/o cold sore for the past 40 days, pt was seen back in April for same complaint, unsure if he was placed on medications.

## 2024-05-11 ENCOUNTER — HOSPITAL ENCOUNTER (EMERGENCY)
Facility: HOSPITAL | Age: 29
Discharge: HOME | End: 2024-05-11
Payer: MEDICAID

## 2024-05-11 ENCOUNTER — APPOINTMENT (OUTPATIENT)
Dept: RADIOLOGY | Facility: HOSPITAL | Age: 29
End: 2024-05-11
Payer: MEDICAID

## 2024-05-11 ENCOUNTER — CLINICAL SUPPORT (OUTPATIENT)
Dept: EMERGENCY MEDICINE | Facility: HOSPITAL | Age: 29
End: 2024-05-11
Payer: MEDICAID

## 2024-05-11 VITALS
DIASTOLIC BLOOD PRESSURE: 81 MMHG | HEIGHT: 71 IN | WEIGHT: 160 LBS | OXYGEN SATURATION: 98 % | HEART RATE: 96 BPM | SYSTOLIC BLOOD PRESSURE: 120 MMHG | BODY MASS INDEX: 22.4 KG/M2 | RESPIRATION RATE: 18 BRPM | TEMPERATURE: 97 F

## 2024-05-11 VITALS
HEART RATE: 96 BPM | DIASTOLIC BLOOD PRESSURE: 90 MMHG | WEIGHT: 160 LBS | BODY MASS INDEX: 22.4 KG/M2 | HEIGHT: 71 IN | SYSTOLIC BLOOD PRESSURE: 136 MMHG | RESPIRATION RATE: 18 BRPM | TEMPERATURE: 96 F | OXYGEN SATURATION: 97 %

## 2024-05-11 DIAGNOSIS — H57.89 EYE IRRITATION: Primary | ICD-10-CM

## 2024-05-11 DIAGNOSIS — K13.70 ORAL LESION: Primary | ICD-10-CM

## 2024-05-11 DIAGNOSIS — M79.604 RIGHT LEG PAIN: ICD-10-CM

## 2024-05-11 DIAGNOSIS — R07.9 CHEST PAIN, UNSPECIFIED TYPE: ICD-10-CM

## 2024-05-11 PROCEDURE — 99281 EMR DPT VST MAYX REQ PHY/QHP: CPT

## 2024-05-11 PROCEDURE — 71046 X-RAY EXAM CHEST 2 VIEWS: CPT | Performed by: INTERNAL MEDICINE

## 2024-05-11 PROCEDURE — 93005 ELECTROCARDIOGRAM TRACING: CPT

## 2024-05-11 PROCEDURE — 2500000001 HC RX 250 WO HCPCS SELF ADMINISTERED DRUGS (ALT 637 FOR MEDICARE OP): Mod: SE | Performed by: PHYSICIAN ASSISTANT

## 2024-05-11 PROCEDURE — 71046 X-RAY EXAM CHEST 2 VIEWS: CPT

## 2024-05-11 PROCEDURE — 99283 EMERGENCY DEPT VISIT LOW MDM: CPT | Mod: 25

## 2024-05-11 PROCEDURE — 99283 EMERGENCY DEPT VISIT LOW MDM: CPT

## 2024-05-11 RX ORDER — NAPROXEN 500 MG/1
500 TABLET ORAL ONCE
Status: COMPLETED | OUTPATIENT
Start: 2024-05-11 | End: 2024-05-11

## 2024-05-11 RX ORDER — NAPROXEN 500 MG/1
500 TABLET ORAL
Qty: 14 TABLET | Refills: 0 | Status: SHIPPED | OUTPATIENT
Start: 2024-05-11 | End: 2024-05-18

## 2024-05-11 RX ADMIN — NAPROXEN 500 MG: 500 TABLET ORAL at 14:02

## 2024-05-11 ASSESSMENT — LIFESTYLE VARIABLES
HAVE PEOPLE ANNOYED YOU BY CRITICIZING YOUR DRINKING: NO
EVER HAD A DRINK FIRST THING IN THE MORNING TO STEADY YOUR NERVES TO GET RID OF A HANGOVER: NO
TOTAL SCORE: 0
EVER FELT BAD OR GUILTY ABOUT YOUR DRINKING: NO
HAVE YOU EVER FELT YOU SHOULD CUT DOWN ON YOUR DRINKING: NO

## 2024-05-11 ASSESSMENT — COLUMBIA-SUICIDE SEVERITY RATING SCALE - C-SSRS
6. HAVE YOU EVER DONE ANYTHING, STARTED TO DO ANYTHING, OR PREPARED TO DO ANYTHING TO END YOUR LIFE?: NO
2. HAVE YOU ACTUALLY HAD ANY THOUGHTS OF KILLING YOURSELF?: NO
2. HAVE YOU ACTUALLY HAD ANY THOUGHTS OF KILLING YOURSELF?: NO
6. HAVE YOU EVER DONE ANYTHING, STARTED TO DO ANYTHING, OR PREPARED TO DO ANYTHING TO END YOUR LIFE?: NO
1. IN THE PAST MONTH, HAVE YOU WISHED YOU WERE DEAD OR WISHED YOU COULD GO TO SLEEP AND NOT WAKE UP?: NO
1. IN THE PAST MONTH, HAVE YOU WISHED YOU WERE DEAD OR WISHED YOU COULD GO TO SLEEP AND NOT WAKE UP?: NO

## 2024-05-11 ASSESSMENT — PAIN SCALES - GENERAL: PAINLEVEL_OUTOF10: 0 - NO PAIN

## 2024-05-11 ASSESSMENT — PAIN - FUNCTIONAL ASSESSMENT: PAIN_FUNCTIONAL_ASSESSMENT: 0-10
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
## 2024-05-11 NOTE — ED PROVIDER NOTES
HPI   Chief Complaint   Patient presents with   • Mouth Lesions   This is a 28 year old male with a past medical history significant for 28-year-old male who has history of schizophrenia, homelessness and polysubstance abuse who presents to the ED for mouth lesions.  Patient states that he has had a lump on the right side of his lower lip for the past week.  He states that he was here last night for a similar complaint, but states that he had something else to do and left without being seen. Denies any ingesting new foods or medications. No injuries or trauma. Denies any throat swelling, difficulty swallowing or shortness of breath.     Denies fevers, chills, headache, dizziness, chest pain, shortness of breath, abdominal pain, N/V/D.     Limitations to history: None  Independent Historians: None  External Records Reviewed: Prior ED notes    Patient History   No past medical history on file.  No past surgical history on file.  No family history on file.  Social History     Tobacco Use   • Smoking status: Not on file   • Smokeless tobacco: Not on file   Substance Use Topics   • Alcohol use: Not on file   • Drug use: Not on file       Physical Exam   ED Triage Vitals [05/11/24 0141]   Temperature Heart Rate Respirations BP   35.6 °C (96 °F) 96 18 136/90      Pulse Ox Temp Source Heart Rate Source Patient Position   97 % Temporal -- --      BP Location FiO2 (%)     -- --       Physical Exam  HENT:      Head: Normocephalic and atraumatic.      Nose: Nose normal.      Mouth/Throat:      Mouth: Mucous membranes are moist.      Pharynx: Oropharynx is clear. No oropharyngeal exudate or posterior oropharyngeal erythema.      Comments: General poor dentition, dental caries present.  Missing multiple front teeth.  No tonsillitis.  Uvula is midline.   No perioral lesions present  Cardiovascular:      Rate and Rhythm: Normal rate and regular rhythm.      Heart sounds: Normal heart sounds.   Pulmonary:      Effort: Pulmonary  effort is normal.      Breath sounds: Normal breath sounds.   Abdominal:      General: Bowel sounds are normal.      Palpations: Abdomen is soft.      Tenderness: There is no abdominal tenderness.   Musculoskeletal:         General: Normal range of motion.   Skin:     General: Skin is warm and dry.      Capillary Refill: Capillary refill takes less than 2 seconds.   Neurological:      General: No focal deficit present.   Psychiatric:         Mood and Affect: Mood normal.         Behavior: Behavior normal.         ED Course & MDM   Diagnoses as of 05/11/24 0211   Oral lesion       Medical Decision Making  This is a 28 year old male with a past medical history significant for 28-year-old male who has history of schizophrenia, homelessness and polysubstance abuse who presents to the ED for mouth lesions.    On physical exam, patient is sitting up comfortably.  He is not ill-appearing and in no acute distress. No intraoral or perioral lesions present.  There is general poor dentition with multiple dental caries present.  Missing multiple front teeth.  Oropharynx is clear with no tonsillitis.  Uvula is midline.    Patient states he is otherwise in his normal state of health.  No acute finding or injuries noted on physical exam.  Provided patient with food and a bus pass, at his request.  Counseled him to follow-up with a primary care physician, provided the contact information for the [facility] clinic.  Instructed him to return to the ED if he experiences any new or worsening symptoms. Discharged stable condition.         Sneha Garcia PA-C  05/11/24 0211
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
## 2024-05-12 LAB
ATRIAL RATE: 100 BPM
P OFFSET: 217 MS
P ONSET: 174 MS
PR INTERVAL: 108 MS
Q ONSET: 228 MS
QRS COUNT: 17 BEATS
QRS DURATION: 84 MS
QT INTERVAL: 330 MS
QTC CALCULATION(BAZETT): 425 MS
QTC FREDERICIA: 391 MS
R AXIS: 30 DEGREES
T AXIS: 60 DEGREES
T OFFSET: 393 MS
VENTRICULAR RATE: 100 BPM

## 2024-05-17 ENCOUNTER — HOSPITAL ENCOUNTER (EMERGENCY)
Facility: HOSPITAL | Age: 29
Discharge: HOME | End: 2024-05-17
Payer: MEDICAID

## 2024-05-17 PROCEDURE — 4500999001 HC ED NO CHARGE

## 2024-05-21 ENCOUNTER — HOSPITAL ENCOUNTER (EMERGENCY)
Facility: HOSPITAL | Age: 29
Discharge: OTHER INSTITUTION | End: 2024-05-21
Payer: MEDICAID

## 2024-05-21 PROCEDURE — 4500999001 HC ED NO CHARGE

## 2024-05-22 ENCOUNTER — APPOINTMENT (OUTPATIENT)
Dept: RADIOLOGY | Facility: HOSPITAL | Age: 29
End: 2024-05-22
Payer: MEDICAID

## 2024-05-22 ENCOUNTER — HOSPITAL ENCOUNTER (EMERGENCY)
Facility: HOSPITAL | Age: 29
Discharge: HOME | End: 2024-05-22
Payer: MEDICAID

## 2024-05-22 VITALS
HEIGHT: 71 IN | HEART RATE: 101 BPM | OXYGEN SATURATION: 96 % | RESPIRATION RATE: 18 BRPM | WEIGHT: 160 LBS | TEMPERATURE: 98.1 F | DIASTOLIC BLOOD PRESSURE: 119 MMHG | SYSTOLIC BLOOD PRESSURE: 167 MMHG | BODY MASS INDEX: 22.4 KG/M2

## 2024-05-22 DIAGNOSIS — M79.672 LEFT FOOT PAIN: ICD-10-CM

## 2024-05-22 DIAGNOSIS — L08.9 LEFT FOOT INFECTION: Primary | ICD-10-CM

## 2024-05-22 PROCEDURE — 90471 IMMUNIZATION ADMIN: CPT | Performed by: NURSE PRACTITIONER

## 2024-05-22 PROCEDURE — 90714 TD VACC NO PRESV 7 YRS+ IM: CPT | Mod: SE | Performed by: NURSE PRACTITIONER

## 2024-05-22 PROCEDURE — 99283 EMERGENCY DEPT VISIT LOW MDM: CPT | Mod: 25

## 2024-05-22 PROCEDURE — 99284 EMERGENCY DEPT VISIT MOD MDM: CPT | Performed by: NURSE PRACTITIONER

## 2024-05-22 PROCEDURE — 2500000001 HC RX 250 WO HCPCS SELF ADMINISTERED DRUGS (ALT 637 FOR MEDICARE OP): Mod: SE | Performed by: NURSE PRACTITIONER

## 2024-05-22 PROCEDURE — 2500000004 HC RX 250 GENERAL PHARMACY W/ HCPCS (ALT 636 FOR OP/ED): Mod: SE | Performed by: NURSE PRACTITIONER

## 2024-05-22 RX ORDER — DOXYCYCLINE 100 MG/1
100 CAPSULE ORAL 2 TIMES DAILY
Qty: 14 CAPSULE | Refills: 0 | Status: SHIPPED | OUTPATIENT
Start: 2024-05-22 | End: 2024-05-29

## 2024-05-22 RX ORDER — DOXYCYCLINE HYCLATE 100 MG
100 TABLET ORAL ONCE
Status: COMPLETED | OUTPATIENT
Start: 2024-05-22 | End: 2024-05-22

## 2024-05-22 RX ORDER — NYSTATIN 100000 [USP'U]/G
1 POWDER TOPICAL 2 TIMES DAILY
Qty: 30 G | Refills: 0 | Status: SHIPPED | OUTPATIENT
Start: 2024-05-22 | End: 2025-05-22

## 2024-05-22 RX ADMIN — CLOSTRIDIUM TETANI TOXOID ANTIGEN (FORMALDEHYDE INACTIVATED) AND CORYNEBACTERIUM DIPHTHERIAE TOXOID ANTIGEN (FORMALDEHYDE INACTIVATED) 0.5 ML: 5; 2 INJECTION, SUSPENSION INTRAMUSCULAR at 08:30

## 2024-05-22 RX ADMIN — DOXYCYCLINE HYCLATE 100 MG: 100 TABLET, COATED ORAL at 08:37

## 2024-05-22 ASSESSMENT — LIFESTYLE VARIABLES
HAVE YOU EVER FELT YOU SHOULD CUT DOWN ON YOUR DRINKING: NO
TOTAL SCORE: 0
HAVE PEOPLE ANNOYED YOU BY CRITICIZING YOUR DRINKING: NO
EVER HAD A DRINK FIRST THING IN THE MORNING TO STEADY YOUR NERVES TO GET RID OF A HANGOVER: NO
EVER FELT BAD OR GUILTY ABOUT YOUR DRINKING: NO

## 2024-05-22 ASSESSMENT — PAIN - FUNCTIONAL ASSESSMENT: PAIN_FUNCTIONAL_ASSESSMENT: 0-10

## 2024-05-22 ASSESSMENT — ENCOUNTER SYMPTOMS: COLOR CHANGE: 1

## 2024-05-22 ASSESSMENT — PAIN DESCRIPTION - ORIENTATION: ORIENTATION: RIGHT;LEFT

## 2024-05-22 ASSESSMENT — PAIN DESCRIPTION - LOCATION: LOCATION: FOOT

## 2024-05-22 ASSESSMENT — PAIN SCALES - GENERAL: PAINLEVEL_OUTOF10: 3

## 2024-05-22 ASSESSMENT — PAIN DESCRIPTION - DESCRIPTORS: DESCRIPTORS: ACHING

## 2024-05-22 ASSESSMENT — PAIN DESCRIPTION - PAIN TYPE: TYPE: ACUTE PAIN

## 2024-05-22 NOTE — DISCHARGE INSTRUCTIONS
You were seen in the emergency room for complaints of left foot pain, on examination, you did have an area of discoloration, which felt could be related to infection    You were ordered x-ray images, which she refused, you are provided antibiotics, tetanus vaccination.    You will need to keep your feet clean and dry, change your socks frequently, use nystatin powder to help with antifungal and moisture.  You need to take your antibiotics, doxycycline until they are completed.    If you have any worsening signs or symptoms, it is recommended you follow-up with your primary care, podiatry, return to the emergency department for further evaluation.  Request for podiatry follow-up has been placed.

## 2024-05-22 NOTE — ED PROVIDER NOTES
Emergency Department Encounter  [facility] EMERGENCY MEDICINE    Patient: Corey Maciel  MRN: [id number]  : 1995  Date of Evaluation: 2024  ED Provider: MEHNAZ Benjamin      Chief Complaint       Chief Complaint   Patient presents with    Foot Injury     [place]    (Location/Symptom, Timing/Onset, Context/Setting, Quality, Duration, Modifying Factors, Severity) Note limiting factors.   Limitations to History: Patient's behavior  Historian: Patient  Records reviewed: EMR inpatient and outpatient notes, Care Everywhere      Corey Maciel is a 28 y.o. male who presents to the emergency department complaining of left foot pain.  Patient started started 1 week ago, states it is mild, does not need anything for intervention this time.  More localized to the plantar aspect, distal. Unable to describe it,  not cooperative with care or forthcoming with information.  States at times it burns.  Denies any injury.    ROS:     Review of Systems   Unable to perform ROS: Other (Patient's lack of cooperative behavior)   Skin:  Positive for color change and rash.     14 systems reviewed and otherwise acutely negative except as in the [place].          Past History   History reviewed. No pertinent past medical history.  History reviewed. No pertinent surgical history.  Social History     Socioeconomic History    Marital status: Single     Spouse name: None    Number of children: None    Years of education: None    Highest education level: None   Occupational History    None   Tobacco Use    Smoking status: Unknown    Smokeless tobacco: None   Substance and Sexual Activity    Alcohol use: None    Drug use: None    Sexual activity: None   Other Topics Concern    None   Social History Narrative    None     Social Determinants of Health     Financial Resource Strain: High Risk (2023)    Received from [facility]    Overall Financial Resource Strain (CARDIA)     Difficulty of Paying Living  Expenses: Hard   Food Insecurity: Food Insecurity Present (2023)    Received from [facility]    Hunger Vital Sign     Worried About Running Out of Food in the Last Year: Sometimes true     Ran Out of Food in the Last Year: Sometimes true   Transportation Needs: Unmet Transportation Needs (2023)    Received from [facility]    PRAPARE - Transportation     Lack of Transportation (Medical): Yes     Lack of Transportation (Non-Medical): Yes   Physical Activity: Not at Risk (2022)    Received from [facility]     Physical Activity     Physical Activity: 1   Stress: Not at Risk (2022)    Received from [facility]     Stress     Stress: 1   Social Connections: At Risk (2022)    Received from [facility]     Social Connections     Social Connections and Isolation: 2   Intimate Partner Violence: Not on file   Housing Stability: At Risk (2022)    [facility]     Housing Stability     Housin       Medications/Allergies     Previous Medications    BACITRACIN 500 UNIT/GRAM OINTMENT    Apply 1 Application topically 2 times a day.    DEXTRAN 70-HYPROMELLOSE, PF, (BION TEARS) 0.1-0.3 % OPHTHALMIC SOLUTION    Administer 1 drop into both eyes 3 times a day as needed for dry eyes.     No Known Allergies     Physical Exam       ED Triage Vitals [24 0452]   Temperature Heart Rate Respirations BP   36.7 °C (98.1 °F) (!) 101 18 (!) 167/119      Pulse Ox Temp Source Heart Rate Source Patient Position   96 % Temporal Monitor Sitting      BP Location FiO2 (%)     Right arm 21 %         Physical Exam  Vitals and nursing note reviewed.   Constitutional:       General: He is not in acute distress.     Appearance: Normal appearance.   HENT:      Head: Normocephalic and atraumatic.   Eyes:      Extraocular Movements: Extraocular movements intact.      Pupils: Pupils are equal, round, and reactive to light.   Cardiovascular:      Rate and Rhythm: Normal rate.   Pulmonary:      Effort: Pulmonary effort is  normal. No respiratory distress.   Musculoskeletal:      Cervical back: Normal range of motion and neck supple.      Left foot: Normal range of motion and normal capillary refill. Deformity (Discoloration to the plantar aspect, distal, foul odor) and tenderness (Plantar aspect, distal) present. No swelling or foot drop. Normal pulse.   Skin:     General: Skin is warm and dry.      Capillary Refill: Capillary refill takes less than 2 seconds.   Neurological:      General: No focal deficit present.      Mental Status: He is alert and oriented to person, place, and time.         Diagnostics   Labs:  Labs Reviewed - No data to display  Radiographs:  XR foot left 3+ views    (Results Pending)             Assessment   In brief, Corey Maciel is a 28 y.o. male who presented to the emergency department for complaints of left foot pain.  Patient denies any injury.  Patient states he just needs an antifungal for athlete's foot.  Patient denies any need for analgesics.    On exam, patient is awake and alert, resting in chair, uncooperative with care, uncooperative with providing history.  Appears in no acute distress.  Respirations even unlabored.  Vital signs stable.  Left foot, plantar aspect, distal near toes, showing white patchy area, tender to palpation.    Patient was initially sent for x-rays, patient refused.  Due to concerns for moisture associated infection, abscess, patient was provided with tetanus, doxycycline.  Patient refused any further care while here in the emergency department.  Despite education.    Plan   Due to patient's declining if any further care in the emergency department, patient will need to utilize nystatin powder to assist with any moisture/fungal of the foot, take his antibiotics until completed to cover for any infection, follow-up with podiatry as the referral has been placed.  Patient has been educated on signs and symptoms of when to return to the emergency department and verbalizes  "understanding.    Differentials   Fungal infection  Foot abscess  Fracture    ED Course     Diagnoses as of 05/22/24 0841   Left foot infection   Left foot pain       Visit Vitals  BP (!) 167/119 (BP Location: Right arm, Patient Position: Sitting)   Pulse (!) 101   Temp 36.7 °C (98.1 °F) (Temporal)   Resp 18   Ht 1.803 m (5' 11\")   Wt 72.6 kg (160 lb)   SpO2 96%   BMI 22.32 kg/m²   Smoking Status Unknown   BSA 1.91 m²       Medications   doxycycline (Vibra-Tabs) tablet 100 mg (100 mg oral Given 5/22/24 0837)   tetanus and diphther. tox (PF) (TENIVAC) 5-2 Lf unit/0.5 mL injection syringe 0.5 mL (0.5 mL intramuscular Given 5/22/24 0830)       Plan of care discussed, Patient, RN      Final Impression      1. Left foot infection    2. Left foot pain          DISPOSITION  Disposition: Discharge to home  Patient condition is stable    Comment: Please note this report has been produced using speech recognition software and may contain errors related to that system including errors in grammar, punctuation, and spelling, as well as words and phrases that may be inappropriate.  If there are any questions or concerns please feel free to contact the dictating provider for clarification.    MEHNAZ Benjamin APRN-CNP  05/22/24 0846    "

## 2024-05-25 ENCOUNTER — HOSPITAL ENCOUNTER (EMERGENCY)
Facility: HOSPITAL | Age: 29
Discharge: HOME | End: 2024-05-25
Payer: MEDICAID

## 2024-05-25 PROCEDURE — 4500999001 HC ED NO CHARGE

## 2024-05-26 ENCOUNTER — HOSPITAL ENCOUNTER (EMERGENCY)
Facility: HOSPITAL | Age: 29
Discharge: HOME | End: 2024-05-26
Attending: EMERGENCY MEDICINE
Payer: MEDICAID

## 2024-05-26 VITALS
HEIGHT: 71 IN | DIASTOLIC BLOOD PRESSURE: 71 MMHG | WEIGHT: 155 LBS | BODY MASS INDEX: 21.7 KG/M2 | TEMPERATURE: 98.2 F | HEART RATE: 110 BPM | RESPIRATION RATE: 16 BRPM | SYSTOLIC BLOOD PRESSURE: 122 MMHG | OXYGEN SATURATION: 100 %

## 2024-05-26 DIAGNOSIS — M25.532 LEFT WRIST PAIN: Primary | ICD-10-CM

## 2024-05-26 PROCEDURE — 99282 EMERGENCY DEPT VISIT SF MDM: CPT | Performed by: EMERGENCY MEDICINE

## 2024-05-26 PROCEDURE — 99284 EMERGENCY DEPT VISIT MOD MDM: CPT | Performed by: EMERGENCY MEDICINE

## 2024-05-26 NOTE — ED TRIAGE NOTES
"Pt reports left hand pain. Does not want an XR because he doesn't like radiation because it could \"release some mercury\"  "
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
